# Patient Record
Sex: MALE | Race: WHITE
[De-identification: names, ages, dates, MRNs, and addresses within clinical notes are randomized per-mention and may not be internally consistent; named-entity substitution may affect disease eponyms.]

---

## 2018-02-01 ENCOUNTER — HOSPITAL ENCOUNTER (OUTPATIENT)
Dept: HOSPITAL 89 - OR | Age: 73
Discharge: HOME | End: 2018-02-01
Attending: SURGERY
Payer: MEDICARE

## 2018-02-01 VITALS — SYSTOLIC BLOOD PRESSURE: 144 MMHG | DIASTOLIC BLOOD PRESSURE: 96 MMHG

## 2018-02-01 VITALS — SYSTOLIC BLOOD PRESSURE: 144 MMHG | DIASTOLIC BLOOD PRESSURE: 102 MMHG

## 2018-02-01 VITALS — BODY MASS INDEX: 20.94 KG/M2 | WEIGHT: 181 LBS | HEIGHT: 78 IN

## 2018-02-01 VITALS — SYSTOLIC BLOOD PRESSURE: 134 MMHG | DIASTOLIC BLOOD PRESSURE: 86 MMHG

## 2018-02-01 VITALS — DIASTOLIC BLOOD PRESSURE: 92 MMHG | SYSTOLIC BLOOD PRESSURE: 141 MMHG

## 2018-02-01 VITALS — DIASTOLIC BLOOD PRESSURE: 94 MMHG | SYSTOLIC BLOOD PRESSURE: 141 MMHG

## 2018-02-01 VITALS — DIASTOLIC BLOOD PRESSURE: 93 MMHG | SYSTOLIC BLOOD PRESSURE: 144 MMHG

## 2018-02-01 VITALS — DIASTOLIC BLOOD PRESSURE: 96 MMHG | SYSTOLIC BLOOD PRESSURE: 135 MMHG

## 2018-02-01 DIAGNOSIS — K40.90: Primary | ICD-10-CM

## 2018-02-01 DIAGNOSIS — I10: ICD-10-CM

## 2018-02-01 LAB — PLATELET COUNT, AUTOMATED: 261 K/UL (ref 150–450)

## 2018-02-01 PROCEDURE — 49650 LAP ING HERNIA REPAIR INIT: CPT

## 2018-02-01 PROCEDURE — 93005 ELECTROCARDIOGRAM TRACING: CPT

## 2018-02-01 PROCEDURE — 36415 COLL VENOUS BLD VENIPUNCTURE: CPT

## 2018-02-01 PROCEDURE — 85025 COMPLETE CBC W/AUTO DIFF WBC: CPT

## 2018-02-01 RX ADMIN — FAMOTIDINE ONE MG: 20 TABLET, FILM COATED ORAL at 11:06

## 2018-02-01 RX ADMIN — FAMOTIDINE ONE MG: 20 TABLET, FILM COATED ORAL at 10:20

## 2018-02-01 NOTE — OPERATIVE REPORT 1
EVENT DATE:  February 1, 2018

SURGEON:  John Ullrich, MD

ANESTHESIOLOGIST:  Gumaro Cabello MD

ANESTHESIA:  General endotracheal anesthesia.





PREOPERATIVE DIAGNOSIS  

Left inguinal hernia.



POSTOPERATIVE DIAGNOSIS 

Left inguinal hernia.



PROCEDURE PERFORMED 

Robotic left inguinal hernia repair with mesh.



COMPLICATIONS 

None.



CONDITION

Stable.



BLOOD LOSS

Minimal.



FINDINGS

Left indirect inguinal hernia.  There was no right inguinal hernia or other 
hernias.



INDICATIONS 

This is a 72-year-old gentleman who presented to my office with a bulge in his 
left groin that was getting bigger and causing him discomfort, and he was 
requesting to have it repaired.  He consented for a robotic left inguinal 
hernia repair.  



DESCRIPTION OF PROCEDURE 

The patient was brought to the operating room and placed supine on the 
operating table.  General endotracheal anesthesia was administered, and his 
abdomen was prepped and draped in a sterile fashion.  A timeout was completed.  
I injected the supraumbilical skin with 0.5% ropivacaine plain.  I made a 
curvilinear frowning face type incision over the superior umbilical rim and 
dissected down through the dermis and subcutaneous fat.  I identified the 
midline fascia, made a vertical incision in the midline fascia, grasped the 
fascial edges with Kocher clamps, retracted the abdominal wall toward the 
ceiling, and then entered the peritoneal cavity with my finger.  I placed 
interrupted 0 Vicryl sutures transversely through the vertical fascial defect 
and inserted a 12 mm Brittani type robotic port through this wound and secured it 
in place with sutures.  I insufflated the abdomen to a pressure of 15 mmHg and 
then inserted the robotic camera.  Under direct visualization, I placed an 8 mm 
robotic port in the right mid abdomen and a second 8 mm port in the left mid 
abdomen.  The patient was placed in Trendelenburg, and the robot was docked and 
targeted.  The instruments were inserted.  I scrubbed out and went to the 
console.  I then divided the peritoneum from the midline over to the lateral 
portion of the peritoneum superomedial to the anterior superior iliac spine.  I 
then  the peritoneum from the preperitoneal tissues all the way down 
and identified the left pubic tubercle and Alvin ligament which were cleaned 
off and then the entire iliopubic tract out to the anterior superior iliac 
spine which was cleaned off as well.  I continued  so I had nice rim 
even inferior to the iliopubic tract so as to allow the mesh to lay nice and 
flat without folding up.  Tendon and nerves were actually seen, and no nerve 
injury occurred during this surgery.  I then identified the cord structures and 
the hernia sac, and I stripped the hernia sac away from the cord structures.  I 
did make a hole in the hernia sac, and this was closed with a V-Loc suture at 
the end of the case.  Once the hernia sac was completely stripped away from the 
cord structures, and the vas and the gonadal vessels were identified, all were 
completely preserved.  The cord structures were skeletonized.  There was no 
direct defect or right inguinal defect.  I inserted a large piece of ProGrip 
left-sided mesh into the abdominal cavity and laid it into place and then 
unfolded it.  It covered the whole myopectineal arch and orifice quite nicely 
with overlap on all sides.  Once I had it positioned in the way I wanted it, I 
pushed it into the tissue so it would not migrate.  I then closed the resulting 
peritoneal incision with a running V-Loc suture, then closed the hole in the 
hernia sac, and tacked the hernia sac to the peritoneum.  After this was 
completed, all the instruments were removed, and the robot was undocked.  The 
abdomen was desufflated.  All of the ports were removed.  I placed an 0 Vicryl 
figure-of-eight suture between the first two sutures in the midline and tied 
all three of these down with good reapproximation of the fascial edges and no 
remaining fascial defect.  I then closed the skin at each port site with 4-0 
Monocryl in a running subcuticular suture.  The skin was cleaned and dried, and 
Steri-Strips were applied, followed by sterile surgical dressings.  The patient 
was awakened and extubated in the operating room and transported to the 
recovery room in stable condition having tolerated the procedure without any 
apparent problems.   
VIRGEN

## 2018-02-01 NOTE — SHORT(OUTPT) DISCHARGE SUMMARY
Discharge Summary


Reason for Hosp/Final Diag:  


(1) Left inguinal hernia


Status:  Chronic


Hospital Course & Plan:  Robotic LIH repair completed without problems.





Departure


Discharge to:  Home, Self Care





Discharge Instructions


Home Meds


Active Scripts


Docusate Sodium (COLACE) 100 Mg Capsule, 1 CAP PO BID, #30 CAP 0 Refills


   TAKE WITH A FULL GLASS OF WATER


   Prov:ULLRICH,JOHN A MD         2/1/18


Oxycodone Hcl/Acet 5/325 Mg (ENDOCET 5-325 TABLET) 1 Each Tablet, 1-2 TAB PO 

Q4H Y for PAIN, #30 TAB 0 Refills


   Prov:ULLRICH,JOHN A MD         2/1/18


Triamcinolone Acetonide 0.1% Cr 15 Gm Tube (TRIAMCINOLONE ACETONIDE 0.1% CREAM) 

15 Gm Cream..g., 1 ASHLEY TP BID Y for RASH, #1 TUBE 1 Refill


   Prov:SHADY DIAMOND MD         6/20/17


Diphenoxylate Hcl/Atropine (LOMOTIL TABLET) 1 Each Tablet, 1-2 TAB PO QID Y for 

diarrhea, #40 TAB 0 Refills


   Prov:SHADY DIAMOND MD         5/2/17


Follow up Referrals:  


General Surgery - 02/20/18 @ Surgery, General with Ullrich,John A Md You have a 

follow up appointment scheduled with Dr. Ullrich on Tuesday, 2/20/18, at 3:30pm.





Diet:  Regular


Activity:  No Heavy Lifting


Special Instructions:  


You may remove the white surgical dressings on Saturday, 2/3/18, then you


can shower.  After showering, leave the incisions open to air but leave


the steristrips in place until they fall off on their own.  Do not immerse


the incisions for 2 weeks.  Avoid any activities that involve straining or


lifting more  than 10 pounds for 2 weeks.











ULLRICH,JOHN A MD Feb 1, 2018 13:45

## 2018-02-01 NOTE — POST OPERATIVE PROGRESS NOTE
Post Operative Progress Note


Date:  Feb 1, 2018


Time:  13:45


Surgeon:  


Ullrich





Dictation number:  [id number]


Anesthesia:  


GETA by Dr. Cabello


Pre-Op Diagnosis:  


LIH


Post-Op Diagnosis:  


KEYA


Findings:  


Left indirect inguinal hernia


Procedure(s):  


Robotic LIH repair with mesh


Specimen Removed:(May be N/A):  


None


Complications:  


None


Fluids:  


See anesthesia record


Estimated Blood Loss:  


Minimal


Date OP Note Dictated:  Feb 1, 2018


Time OP Note Dictated:  13:46











ULLRICH,JOHN A MD Feb 1, 2018 13:53

## 2018-02-01 NOTE — EKG
FACILITY: [facility] 

 

PATIENT NAME: ZAIDA OCHOA

: [id number]

MR: [id number]

V: [id number]

EXAM DATE: 

ORDERING PHYSICIAN: PACO HANSEN

TECHNOLOGIST: YEYO

 

Test Reason : PRE-OP

Blood Pressure : ***/*** mmHG

Vent. Rate : 068 BPM     Atrial Rate : 068 BPM

   P-R Int : 140 ms          QRS Dur : 088 ms

    QT Int : 422 ms       P-R-T Axes : 058 009 037 degrees

   QTc Int : 448 ms

 

Sinus rhythm with marked sinus arrhythmia

Otherwise normal ECG

No previous ECGs available

Confirmed by JOLENE JO ([id number]) on 2/3/2018 7:43:24 AM

 

Referred By:  ULLRICH           Confirmed By:JOLENE JO

## 2018-05-07 ENCOUNTER — HOSPITAL ENCOUNTER (OUTPATIENT)
Dept: HOSPITAL 89 - MRI | Age: 73
End: 2018-05-07
Attending: SURGERY
Payer: MEDICARE

## 2018-05-07 DIAGNOSIS — N28.1: Primary | ICD-10-CM

## 2018-05-07 PROCEDURE — 74183 MRI ABD W/O CNTR FLWD CNTR: CPT

## 2018-05-07 NOTE — RADIOLOGY IMAGING REPORT
[place] [facility] [place] 

 

PATIENT NAME: Jerzy Mcdonald

: 1945

MR: [id number]

V: [id number]

EXAM DATE: 

ORDERING PHYSICIAN: AYANNA ZHANG

TECHNOLOGIST: 

 

Location: [facility]

Patient: Jerzy Mcdonald

: 1945

MRN: [id number]

Visit/Account:[id number]

Date of Sevice:  2018

 

ACCESSION #: [id number]

 

ABDOMEN W W/O CONTRAST

 

HISTORY:  Abnormal CT of pancreas

 

ADDITIONAL HISTORY:  None.

 

TECHNIQUE:  TECHNIQUE:  Multiplanar multisequence magnetic resonance imaging of the abdomen with and 
without intravenous contrast.

 

CONTRAST:  15 mL of MultiHance

 

COMPARISON:  CT abdomen pelvis 2017

 

FINDINGS:

Visualized lung bases: Grossly unremarkable.

 

Liver: In the caudate lobe just anterior to the IVC there is a 1 x 1.2 x 0.9 cm area of contrast-enha
ncement that appears to follow the portal venous phase of contrast enhancement likely a small inciden
jim hemangioma

 

Gallbladder: Negative.

 

Bile ducts: Nondistended and unremarkable.

 

Spleen: Negative.

 

Adrenal glands: Negative.

 

Pancreas: Pancreas appears unremarkable there for the hypodensity along the medial aspect of the head
 on the prior CT likely represents a fatty cleft

 

Kidneys: There are multiple small cysts in both kidneys, left more so than right

 

Vessels/spaces/nodes: No bulky adenopathy or ascities.

 

Visualized GI: The previously noted 8 mm nodule seen along the medial aspect of the gastric fundus on
 the prior CT is not appreciated by MR

 

Bones/soft tissues: Unremarkable.

 

IMPRESSION:

 

The pancreas appears unremarkable there for the hypodensity along the medial aspect of the head of th
e pancreas on the prior CT likely represented a fatty cleft

 

 

 

There is a 1 x 1.2 x 0.9 cm area of contrast-enhancement in the caudate lobe of the liver just anteri
or to the IVC which follows the portal venous phase of contrast enhancement likely represents a small
 incidental hemangioma

 

Small renal cysts

 

Previously noted 8 mm nodule along the medial aspect gastric fundus is no longer seen

 

Report Dictated By: Clary Napoles MD at 2018 11:29 AM

 

Report E-Signed By: Clary Napoles MD  at 2018 11:46 AM

 

WSN:[id number]

## 2018-10-15 ENCOUNTER — HOSPITAL ENCOUNTER (OUTPATIENT)
Dept: HOSPITAL 89 - CT | Age: 73
End: 2018-10-15
Attending: SURGERY
Payer: MEDICARE

## 2018-10-15 DIAGNOSIS — K59.00: ICD-10-CM

## 2018-10-15 DIAGNOSIS — R91.8: ICD-10-CM

## 2018-10-15 DIAGNOSIS — K57.30: ICD-10-CM

## 2018-10-15 DIAGNOSIS — K42.9: Primary | ICD-10-CM

## 2018-10-15 PROCEDURE — 74178 CT ABD&PLV WO CNTR FLWD CNTR: CPT

## 2018-10-15 PROCEDURE — 36415 COLL VENOUS BLD VENIPUNCTURE: CPT

## 2018-10-15 PROCEDURE — 82565 ASSAY OF CREATININE: CPT

## 2018-10-15 NOTE — RADIOLOGY IMAGING REPORT
FACILITY: [facility] 

 

PATIENT NAME: Jerzy Mcdonald

: 1945

MR: [id number]

V: [id number]

EXAM DATE: 

ORDERING PHYSICIAN: JOHN ULLRICH

TECHNOLOGIST: 

 

Location: [facility]

Patient: Jerzy Mcdonald

: 1945

MRN: [id number]

Visit/Account:[id number]

Date of Sevice: 10/15/2018

 

ACCESSION #: [id number]

 

ABDOMEN/PELVIS W/WO CONTRAST

 

HISTORY:  Left groin pain

 

TECHNIQUE: Axial images acquired through the abdomen/pelvis both with and without IV contrast..  Madi
nal and sagittal reformatting also performed. Dose Lowering Technique

 

One of the following dose optimization techniques was utilized in the performance of this exam: Autom
ated exposure control; adjustment of the mA and/or kV according to the patient's size; or use of an i
terative  reconstruction technique.  Specific details can be referenced in the facility's radiology C
T exam operational policy.

 

 

 

CONTRAST:  75 mL Isovue-370

 

COMPARISON:  MR the abdomen May 7, 2018 and CT of the abdomen and pelvis 2017

 

FINDINGS:

 

Visualized lung bases:  Small calcified granulomas right middle lobe and right lower lobe

 

Hepatobiliary:  Subtle area of contrast-enhancement in the caudate lobe of the liver just anterior to
 the IVC represents the hemangioma identified on the prior MR the abdomen from May 7, 2018.  This was
 much better depicted on the prior MR

 

Spleen:  Negative.

 

Adrenals:  Negative.

 

Pancreas:  Negative.

 

Kidneys ureters and bladder: Subcentimeter cortical hypodensities in both kidneys likely represent cy
sts although are too small to characterize  .  No demonstration of urolithiasis hydronephrosis or hyd
roureter.

 

The bladder wall appears thickened although may be related to underdistention with urine.

 

Genitalia:  Brachytherapy seeds are present within the prostate

 

GI: There Is diverticulosis of the sigmoid colon although no CT evidence of acute diverticulitis.

 

There is a moderate amount of fecal material in colon which can be seen with constipation

 

Vessels/spaces/nodes:  Serpiginous vessels are seen in the left side of the scrotal sac.  This may re
present a varicocele

 

Bones/soft tissues:  There is a small umbilical hernia containing fat.  There are spondylotic changes
 lumbar spine

 

Additional findings:  None pertinent.

 

IMPRESSION:

 

No demonstration of urolithiasis, hydronephrosis or hydroureter

 

Calcified granulomas in the right lung

 

Serpiginous vessels are seen in the left side of the scrotal sac which could represent a varicocele

 

Small focal hernia containing fat

 

Diverticulosis sigmoid colon although no CT evidence of acute diverticulitis.

 

Moderate amount of fecal material in the colon which can be seen with constipation

 

Subtle area of contrast-enhancement in the caudate lobe of the liver just anterior to the IVC represe
nts a hemangioma which was better seen on a prior MR

 

Report Dictated By: Clary Napoles MD at 10/15/2018 11:08 AM

 

Report E-Signed By: Clary Napoles MD  at 10/15/2018 11:22 AM

 

JOSE RAMONN:EMY

## 2019-03-06 ENCOUNTER — HOSPITAL ENCOUNTER (OUTPATIENT)
Dept: HOSPITAL 89 - LAB | Age: 74
End: 2019-03-06
Attending: INTERNAL MEDICINE
Payer: COMMERCIAL

## 2019-03-06 DIAGNOSIS — I10: Primary | ICD-10-CM

## 2019-03-06 DIAGNOSIS — R91.1: ICD-10-CM

## 2019-03-06 DIAGNOSIS — Z85.46: ICD-10-CM

## 2019-03-06 LAB — PLATELET COUNT, AUTOMATED: 244 K/UL (ref 150–450)

## 2019-03-06 PROCEDURE — 85025 COMPLETE CBC W/AUTO DIFF WBC: CPT

## 2019-03-06 PROCEDURE — 84520 ASSAY OF UREA NITROGEN: CPT

## 2019-03-06 PROCEDURE — 36415 COLL VENOUS BLD VENIPUNCTURE: CPT

## 2019-03-06 PROCEDURE — 82565 ASSAY OF CREATININE: CPT

## 2019-03-12 ENCOUNTER — HOSPITAL ENCOUNTER (OUTPATIENT)
Dept: HOSPITAL 89 - CT | Age: 74
End: 2019-03-12
Attending: INTERNAL MEDICINE
Payer: COMMERCIAL

## 2019-03-12 DIAGNOSIS — R91.8: Primary | ICD-10-CM

## 2019-03-12 PROCEDURE — 71270 CT THORAX DX C-/C+: CPT

## 2019-03-12 NOTE — RADIOLOGY IMAGING REPORT
FACILITY: [facility] 

 

PATIENT NAME: Jerzy Mcdonald

: 1945

MR: [id number]

V: [id number]

EXAM DATE: 

ORDERING PHYSICIAN: JEFF BEATTY

TECHNOLOGIST: 

 

Location: [facility]

Patient: Jerzy Mcdonald

: 1945

MRN: [id number]

Visit/Account:[id number]

Date of Sevice:  3/12/2019

 

ACCESSION #: [id number]

 

CT CHEST W & W/O CON

 

History:  pulmonary nodule

 

ADDITIONAL CLINICAL HISTORY:   None

 

TECHNIQUE:   Contiguous axial images were performed through the chest to the level of the adrenal gla
nds with and without IV contrast.   Coronal and sagittal reformatting was also performed.Dose Lowerin
g Technique

 

One of the following dose optimization techniques was utilized in the performance of this exam: Autom
ated exposure control; adjustment of the mA and/or kV according to the patient's size; or use of an i
terative  reconstruction technique.  Specific details can be referenced in the facility's radiology C
T exam operational policy.

 

Contrast:  75 mL Isovue-370

 

COMPARISON STUDIES:   CT abdomen pelvis October 15, 2018.

 

Lungs / Pleura:   Very mild fibrotic changes are seen symmetrically in the pulmonary apices.

 

 there is an 11 x 5 mm pleural-based nodule posterior medial aspect of the superior segment of the ri
ght lower lobe best seen on image 124 series 4.

 

There are two contiguous pleural-based nodules posterior aspect of the right lower lobe one measuring
 6 mm one measuring 3 mm.

 

There are small calcified granulomas scattered throughout the lungs

 

Mediastinum/nodes:   negative.

 

Heart and vessels:   negative.

 

Musculoskeletal / Body wall:   There is a levoconvex scoliosis of the thoracolumbar spine.  There are
 mild multilevel spondylotic changes

 

Upper abdomen:   There are subcentimeter cortical hypodensities in both kidneys which are too small t
o characterize

 

IMPRESSION:

 

Scattered granulomas seen throughout the lungs

 

There are three pleural-based nodules posterior medial aspect of the right upper lobe as described ab
ove ranging in size up to 8 mm

 

 

 

----------

[employer]LE[employer] SOC[employer]ET[employer] FOLLOW-UP GUIDELINES FOR NEWLY DETECTED INCIDENTAL NODULES IN PERSONS 35 YEARS OF 
AGE OR OLDER.

 

*These recommendations do NOT apply to lung cancer screening, patients with immunosuppression or octavio
ents with a known primary malignancy.

 

MULTIPLE SOLID NODULES

 

If nodule size is < 6 mm:

*  Low risk patient ? No routine follow-up.

*  High risk patient ? Optional CT at 12 months.

 

If nodule size is 6-8 mm:

*  Low risk patient ? CT at 3-6 months, then consider CT at 18-24 months if no change.

*  High risk patient ? CT at 3-6 months, then CT at 18-24 months if no change.

 

If nodule size is > 8 mm:

*  Low risk patient ? CT at 3-6 months, then consider CT at 18-24 months if no change.

*  High risk patient ? CT at 3-6 months, then consider CT at 18-24 months if no change.

 

LOW RISK PATIENT: Minimal or absent history of tobacco use and of other known risk factors.

HIGH RISK PATIENT: Tobacco use, family history of lung cancer, upper pulmonary lobe location of nodul
e, presence of emphysema, pulmonary fibrosis, older age.

 

Estelle H, Willa DP, Atiya PRATT, et al. Guidelines for Management of Incidental Pulmonary Nodules Dete
cted on CT Images: From the Fleischner Society 2017. Radiology.

 

[facility]

 

Report Dictated By: Clary Napoles MD at 3/12/2019 1:38 PM

 

Report E-Signed By: Clary Napoles MD  at 3/12/2019 1:51 PM

 

WSN:[email or web address]

## 2019-08-19 ENCOUNTER — HOSPITAL ENCOUNTER (OUTPATIENT)
Dept: HOSPITAL 89 - CT | Age: 74
End: 2019-08-19
Attending: INTERNAL MEDICINE
Payer: MEDICARE

## 2019-08-19 DIAGNOSIS — R91.8: Primary | ICD-10-CM

## 2019-08-19 PROCEDURE — 71270 CT THORAX DX C-/C+: CPT

## 2019-08-19 NOTE — RADIOLOGY IMAGING REPORT
FACILITY: [facility] [place] 

 

PATIENT NAME: Jerzy Mcdonald

: 1945

MR: [id number]

V: [id number]

EXAM DATE: 

ORDERING PHYSICIAN: JEFF BEATTY

TECHNOLOGIST: 

 

Location: [facility]

Patient: Jerzy Mcdonald

: 1945

MRN: [id number]

Visit/Account:[id number]

Date of Sevice:  2019

 

ACCESSION #: [id number]

 

CT CHEST W & W/O CON

 

History:  Pulmonary nodule

 

ADDITIONAL CLINICAL HISTORY:   None

 

TECHNIQUE:   Contiguous axial images were performed through the chest to the level of the adrenal gla
nds with and without IV contrast.   Coronal and sagittal reformatting was also performed.Dose Lowerin
g Technique

 

One of the following dose optimization techniques was utilized in the performance of this exam: Autom
ated exposure control; adjustment of the mA and/or kV according to the patient's size; or use of an i
terative  reconstruction technique.  Specific details can be referenced in the facility's radiology C
T exam operational policy.

 

Contrast:  75 mL Isovue-370

 

COMPARISON STUDIES:   2019.

 

Lungs / Pleura:   Scattered calcified granulomas again seen throughout the lungs.

 

The previously noted 11 x 5 mm pleural-based nodule posterior medial aspect of the superior segment o
f the right lower lobe appears unchanged and is best appreciated on image 123 of series 7.

 

The two contiguous pleural-based nodules posterior aspect of the right lower lobe also appear relativ
ely unchanged one measuring 6 mm and one measuring 3 mm in diameter best appreciated on images 142 14
4

 

Mediastinum/nodes:   negative.

 

Heart and vessels:  There are very mild calcified coronary arteries

 

Musculoskeletal / Body wall:   Levoconvex scoliosis of the lumbar spine with multilevel spondylotic c
hanges.  There are moderate to severe degenerative changes at both shoulder joints

 

Upper abdomen:   Subcentimeter hypodensities in both kidneys may represent cysts although are too sma
ll to characterize

 

IMPRESSION:

 

The three pleural-based nodules ranging up to 8 mm in average diameter in the right lung appear relat
ively unchanged when compared the prior study.

 

----------

FLE[employer] SOCIETY FOLLOW-UP GUIDELINES FOR NEWLY DETECTED INCIDENTAL NODULES IN PERSONS 35 YEARS OF 
AGE OR OLDER.

 

*These recommendations do NOT apply to lung cancer screening, patients with immunosuppression or octavio
ents with a known primary malignancy.

 

MULTIPLE SOLID NODULES

 

If nodule size is < 6 mm:

*  Low risk patient ? No routine follow-up.

*  High risk patient ? Optional CT at 12 months.

 

If nodule size is 6-8 mm:

*  Low risk patient ? CT at 3-6 months, then consider CT at 18-24 months if no change.

*  High risk patient ? CT at 3-6 months, then CT at 18-24 months if no change.

 

If nodule size is > 8 mm:

*  Low risk patient ? CT at 3-6 months, then consider CT at 18-24 months if no change.

*  High risk patient ? CT at 3-6 months, then consider CT at 18-24 months if no change.

 

LOW RISK PATIENT: Minimal or absent history of tobacco use and of other known risk factors.

HIGH RISK PATIENT: Tobacco use, family history of lung cancer, upper pulmonary lobe location of nodul
e, presence of emphysema, pulmonary fibrosis, older age.

 

Estelle H, Willa DP, Tariko JM, et al. Guidelines for Management of Incidental Pulmonary Nodules Dete
cted on CT Images: From the Fleischner Society 2017. Radiology.

 

[facility]

 

 

Report Dictated By: Clary Napoles MD at 2019 10:41 AM

 

Report E-Signed By: Clary Napoles MD  at 2019 10:55 AM

 

JOSE RAMONN:EMY